# Patient Record
Sex: FEMALE | Race: WHITE
[De-identification: names, ages, dates, MRNs, and addresses within clinical notes are randomized per-mention and may not be internally consistent; named-entity substitution may affect disease eponyms.]

---

## 2019-04-16 ENCOUNTER — HOSPITAL ENCOUNTER (EMERGENCY)
Dept: HOSPITAL 25 - UCCORT | Age: 74
Discharge: HOME HEALTH | End: 2019-04-16
Payer: COMMERCIAL

## 2019-04-16 VITALS — DIASTOLIC BLOOD PRESSURE: 67 MMHG | SYSTOLIC BLOOD PRESSURE: 166 MMHG

## 2019-04-16 DIAGNOSIS — R60.0: Primary | ICD-10-CM

## 2019-04-16 DIAGNOSIS — M79.662: ICD-10-CM

## 2019-04-16 DIAGNOSIS — I10: ICD-10-CM

## 2019-04-16 PROCEDURE — 99212 OFFICE O/P EST SF 10 MIN: CPT

## 2019-04-16 PROCEDURE — G0463 HOSPITAL OUTPT CLINIC VISIT: HCPCS

## 2019-04-16 NOTE — UC
Lower Extremity/Ankle HPI





- HPI Summary


HPI Summary: 





74 yo female with achy left calf pain and swelling x a day





yesterday she twisted her left ankle getting ready for work but she was having 

pain before this





no back pain





no hx DVT





- History of Current Complaint


Chief Complaint: UCLowerExtremity


Stated Complaint: LEFT LEG INJ


Time Seen by Provider: 04/16/19 16:52


Hx Obtained From: Patient


Onset/Duration: Gradual Onset, Lasting Hours


Severity Initially: Mild


Severity Currently: Moderate


Pain Intensity: 6


Pain Scale Used: 0-10 Numeric


Aggravating Factor(s): Standing, Ambulation


Alleviating Factor(s): Rest, Elevation


Able to Bear Weight: Yes


Legs: 


  __________________________














  __________________________





 1 - pain/swelling/tenderness, antalgic gait








- Allergies/Home Medications


Allergies/Adverse Reactions: 


 Allergies











Allergy/AdvReac Type Severity Reaction Status Date / Time


 


No Known Allergies Allergy   Verified 04/16/19 16:28














PMH/Surg Hx/FS Hx/Imm Hx


Previously Healthy: Yes


Cardiovascular History: Hypertension





- Surgical History


Surgical History: Yes


Surgery Procedure, Year, and Place: Left hand tendon repair.  hole repaired in 

heart 1958





- Family History


Known Family History: Positive: Hypertension





- Social History


Alcohol Use: None


Substance Use Type: None


Smoking Status (MU): Never Smoked Tobacco





Review of Systems


All Other Systems Reviewed And Are Negative: Yes


Constitutional: Positive: Negative


Skin: Positive: Negative


Eyes: Positive: Negative


ENT: Positive: Negative


Respiratory: Positive: Negative


Cardiovascular: Positive: Negative


Gastrointestinal: Positive: Negative


Genitourinary: Positive: Negative


Motor: Positive: Negative


Neurovascular: Positive: Negative


Musculoskeletal: Positive: Edema - left lower extr, Myalgia - left calf


Neurological: Positive: Negative


Psychological: Positive: Negative





Physical Exam


Triage Information Reviewed: Yes


Appearance: Well-Appearing, No Pain Distress, Well-Nourished


Vital Signs: 


 Initial Vital Signs











Temp  97.6 F   04/16/19 16:18


 


Pulse  53   04/16/19 16:18


 


Resp  18   04/16/19 16:18


 


BP  166/67   04/16/19 16:18


 


Pulse Ox  100   04/16/19 16:18











Eyes: Positive: Conjunctiva Clear


ENT: Positive: Hearing grossly normal.  Negative: Nasal congestion, Nasal 

drainage, Trismus, Muffled voice, Hoarse voice, Uvula midline


Dental Exam: Normal


Neck: Positive: Supple, Nontender, No Lymphadenopathy


Respiratory: Positive: Lungs clear, Normal breath sounds, No respiratory 

distress, No accessory muscle use


Cardiovascular: Positive: RRR, No Murmur


Musculoskeletal: Positive: Edema @ - left LE, Other: - left calf


Neurological: Positive: Alert


Psychological Exam: Normal





Lower Extremity Course/Dx





- Course


Course Of Treatment: 





d/w Jacqueline Higuera NP


to [facility] for further evaluation





- Differential Dx/Diagnosis


Provider Diagnosis: 


 Leg edema, left








Discharge





- Sign-Out/Discharge


Documenting (check all that apply): Patient Departure


All imaging exams completed and their final reports reviewed: No Studies





- Discharge Plan


Condition: Stable


Disposition: HOME-RECOMMEND TO ED


Referrals: 


Jigar Green DO [Primary Care Provider] - 


Additional Instructions: 


I suggest you go to the ER for further evaluation of your left calf pain and 

swelling





I spoke to Jacqueline Hiugera NP and they are expecting you





- Billing Disposition and Condition


Condition: STABLE


Disposition: Home-Recommend to ED

## 2020-01-25 ENCOUNTER — HOSPITAL ENCOUNTER (EMERGENCY)
Dept: HOSPITAL 25 - UCCORT | Age: 75
Discharge: HOME | End: 2020-01-25
Payer: COMMERCIAL

## 2020-01-25 VITALS — SYSTOLIC BLOOD PRESSURE: 132 MMHG | DIASTOLIC BLOOD PRESSURE: 56 MMHG
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
## 2020-01-25 DIAGNOSIS — I10: ICD-10-CM

## 2020-01-25 DIAGNOSIS — T20.47XA: ICD-10-CM

## 2020-01-25 DIAGNOSIS — T65.891A: Primary | ICD-10-CM

## 2020-01-25 PROCEDURE — G0463 HOSPITAL OUTPT CLINIC VISIT: HCPCS

## 2020-01-25 PROCEDURE — 99212 OFFICE O/P EST SF 10 MIN: CPT

## 2020-01-25 NOTE — UC
Skin Complaint HPI





- HPI Summary


HPI Summary: 





Pt presents with c/o erythematous,  mildly tender skin where she applied apple 

cider vinegar in an attempt to remove skin tags. Pt states she applied the 

vinegar and left i on for 20 minutes.  





- History of Current Complaint


Chief Complaint: UCSkin


Time Seen by Provider: 01/25/20 14:51


Stated Complaint: NECK BURN


Hx Obtained From: Patient


Pregnant?: No


Onset/Duration: Gradual Onset, Lasting Days, Still Present


Skin Exposure Onset/Duration: Minutes Ago - 20


Timing: Constant


Onset Severity: Moderate


Current Severity: Moderate


Pain Intensity: 7


Location: Discrete - neck


Character: Swelling, Pain, Redness


Aggravating Factor(s): Touch


Alleviating Factor(s): Nothing


Associated Signs & Symptoms: Positive: Rash, Tenderness


Related History: Possible Reaction to: Food - vinegar applied to skin





- Allergy/Home Medications


Allergies/Adverse Reactions: 


 Allergies











Allergy/AdvReac Type Severity Reaction Status Date / Time


 


No Known Allergies Allergy   Verified 01/25/20 14:56











Home Medications: 


 Home Medications





Omega-3/Dha/Epa/Fish Oil [Fish Oil 1,000 mg Softgel] 1,000 mg PO DAILY 01/25/20 

[History Confirmed 01/25/20]


Pravastatin Sodium 40 mg PO DAILY 01/25/20 [History Confirmed 01/25/20]











PMH/Surg Hx/FS Hx/Imm Hx


Previously Healthy: Yes


Cardiovascular History: Cardiac Disease, Hypertension





- Surgical History


Surgical History: Yes


Surgery Procedure, Year, and Place: Left hand tendon repair.  hole repaired in 

heart 1958





- Family History


Known Family History: Positive: Hypertension





- Social History


Occupation: Retired


Lives: With Family


Alcohol Use: None


Substance Use Type: None


Smoking Status (MU): Never Smoked Tobacco


Have You Smoked in the Last Year: No





- Immunization History


Vaccination Up to Date: Yes





Review of Systems


All Other Systems Reviewed And Are Negative: Yes


Constitutional: Positive: Negative


Skin: Positive: Rash


Eyes: Positive: Negative


ENT: Positive: Negative


Respiratory: Positive: Negative


Cardiovascular: Positive: Negative


Gastrointestinal: Positive: Negative


Genitourinary: Positive: Negative


Motor: Positive: Negative


Neurovascular: Positive: Negative


Musculoskeletal: Positive: Negative


Neurological: Positive: Negative


Psychological: Positive: Negative


Is Patient Immunocompromised?: No





Physical Exam


Triage Information Reviewed: Yes


Appearance: Well-Appearing


Vital Signs: 


 Initial Vital Signs











Temp  98.6 F   01/25/20 14:53


 


Pulse  67   01/25/20 14:53


 


Resp  16   01/25/20 14:53


 


BP  132/56   01/25/20 14:53


 


Pulse Ox  100   01/25/20 14:53











Vital Signs Reviewed: Yes


Eye Exam: Normal


ENT Exam: Normal


Dental Exam: Normal


Neck exam: Other - erythematous skin


Respiratory Exam: Normal


Respiratory: Positive: No respiratory distress


Musculoskeletal Exam: Normal


Neurological Exam: Normal


Psychological Exam: Normal


Skin Exam: Other - mild erythema, and mildly swollen skin on anteiror neck. All 

skin tags still attached





Course/Dx





- Differential Diagnoses - Skin Complaint


Differential Diagnoses: Drug Rash, Urticaria





- Diagnoses


Provider Diagnosis: 


 Chemical burn of skin








Discharge ED





- Sign-Out/Discharge


Documenting (check all that apply): Patient Departure


All imaging exams completed and their final reports reviewed: No Studies





- Discharge Plan


Condition: Stable


Disposition: HOME


Prescriptions: 


predniSONE 10 mg TAB [Deltasone 10 MG TAB*] 30 mg PO DAILY #12 tab


Patient Education Materials:  Chemical Skin Burn (ED)


Referrals: 


Jigar Green DO [Primary Care Provider] - If Needed





- Billing Disposition and Condition


Condition: STABLE


Disposition: Home





- Attestation Statements


Provider Attestation: 





Per institutional requirements, I have reviewed the chart, however, I was not 

consulted specifically or made aware of this patient by the  midlevel provider.

  I did not personally evaluate, interact with, or disposition this patient. EK